# Patient Record
Sex: MALE | Race: WHITE | URBAN - METROPOLITAN AREA
[De-identification: names, ages, dates, MRNs, and addresses within clinical notes are randomized per-mention and may not be internally consistent; named-entity substitution may affect disease eponyms.]

---

## 2019-04-11 ENCOUNTER — HOSPITAL ENCOUNTER (EMERGENCY)
Facility: CLINIC | Age: 39
Discharge: HOME | End: 2019-04-11
Attending: NURSE PRACTITIONER | Admitting: NURSE PRACTITIONER

## 2019-04-11 VITALS
SYSTOLIC BLOOD PRESSURE: 143 MMHG | WEIGHT: 185 LBS | OXYGEN SATURATION: 98 % | RESPIRATION RATE: 16 BRPM | TEMPERATURE: 98 F | DIASTOLIC BLOOD PRESSURE: 99 MMHG

## 2019-04-11 DIAGNOSIS — H01.006: ICD-10-CM

## 2019-04-11 PROCEDURE — G0463 HOSPITAL OUTPT CLINIC VISIT: HCPCS | Performed by: NURSE PRACTITIONER

## 2019-04-11 PROCEDURE — 99202 OFFICE O/P NEW SF 15 MIN: CPT | Mod: Z6 | Performed by: NURSE PRACTITIONER

## 2019-04-11 RX ORDER — ERYTHROMYCIN 5 MG/G
0.5 OINTMENT OPHTHALMIC 4 TIMES DAILY
Qty: 3.5 G | Refills: 0 | Status: SHIPPED | OUTPATIENT
Start: 2019-04-11

## 2019-04-11 ASSESSMENT — ENCOUNTER SYMPTOMS
DIAPHORESIS: 0
WOUND: 0
SINUS PAIN: 0
COUGH: 0
CHILLS: 0
SPEECH DIFFICULTY: 0
CONSTIPATION: 0
SORE THROAT: 0
EYE ITCHING: 1
SHORTNESS OF BREATH: 0
VOMITING: 0
BLOOD IN STOOL: 0
HEMATURIA: 0
NAUSEA: 0
FEVER: 0
EYE PAIN: 1
EYE DISCHARGE: 1
ABDOMINAL PAIN: 0
DYSURIA: 0
DIARRHEA: 0
WHEEZING: 0
DIFFICULTY URINATING: 0
CONFUSION: 0
HEADACHES: 0

## 2019-04-11 NOTE — ED AVS SNAPSHOT
[facility]  [address] [address]  Phone:  [phone]  Fax:  [phone]                                    Dante Carranza   MRN: [id number]    Department:  [facility] Department   Date of Visit:  4/11/2019           After Visit Summary Signature Page    I have received my discharge instructions, and my questions have been answered. I have discussed any challenges I see with this plan with the nurse or doctor.    ..........................................................................................................................................  Patient/Patient Representative Signature      ..........................................................................................................................................  Patient Representative Print Name and Relationship to Patient    ..................................................               ................................................  Date                                   Time    ..........................................................................................................................................  Reviewed by Signature/Title    ...................................................              ..............................................  Date                                               Time          22EPIC Rev 08/18

## 2019-04-12 NOTE — ED PROVIDER NOTES
History     Chief Complaint   Patient presents with     Eye Problem     left eye burning sensation no injury no pain     HPI  Presents for evaluation for the following:     Eye(s) Problem      Duration: one day    Description:  Location: left  Pain: YES- dry, irritation, rough sensation  Redness: YES- upper eyelid  Discharge: YES- crusted in eyelid    Accompanying signs and symptoms: feeling tired    History (Trauma, foreign body exposure,): None    Precipitating or alleviating factors (contact use): None    Does not wear contacts or glasses    Therapies tried and outcome: None     Allergies:  Allergies   Allergen Reactions     Penicillins      rash       Problem List:    There are no active problems to display for this patient.       Past Medical History:    No past medical history on file.    Past Surgical History:    No past surgical history on file.    Family History:    No family history on file.    Social History:  Marital Status:  Single [1]  Social History     Tobacco Use     Smoking status: Not on file   Substance Use Topics     Alcohol use: Not on file     Drug use: Not on file        Medications:      erythromycin (ROMYCIN) 5 MG/GM ophthalmic ointment         Review of Systems   Constitutional: Negative for chills, diaphoresis and fever.   HENT: Negative for ear pain, sinus pain and sore throat.    Eyes: Positive for pain (feels soreness with eyelid movement), discharge (in eyelash) and itching. Negative for visual disturbance.   Respiratory: Negative for cough, shortness of breath and wheezing.    Cardiovascular: Negative for chest pain.   Gastrointestinal: Negative for abdominal pain, blood in stool, constipation, diarrhea, nausea and vomiting.   Genitourinary: Negative for difficulty urinating, dysuria and hematuria.   Skin: Negative for rash and wound.   Neurological: Negative for speech difficulty and headaches.   Psychiatric/Behavioral: Negative for confusion and self-injury.   All other systems  reviewed and are negative.      Physical Exam   BP: (!) 143/99  Heart Rate: 84  Temp: 98  F (36.7  C)  Resp: 16  Weight: 83.9 kg (185 lb)  SpO2: 98 %      Physical Exam   Constitutional: He is oriented to person, place, and time. He appears well-developed and well-nourished. No distress.   HENT:   Head: Normocephalic and atraumatic.   Right Ear: Hearing, tympanic membrane, external ear and ear canal normal.   Left Ear: Hearing, tympanic membrane, external ear and ear canal normal.   Nose: Nose normal.   Mouth/Throat: Uvula is midline, oropharynx is clear and moist and mucous membranes are normal.   Eyes: Pupils are equal, round, and reactive to light. EOM and lids are normal. Right eye exhibits no discharge. Left eye exhibits discharge (crusted in upper eyelid). Right conjunctiva is not injected. Left conjunctiva is not injected.       Neck: Neck supple.   Cardiovascular: Normal rate, regular rhythm, normal heart sounds and intact distal pulses. Exam reveals no gallop and no friction rub.   No murmur heard.  Pulmonary/Chest: Effort normal and breath sounds normal. No stridor. He has no wheezes.   Abdominal: There is no tenderness.   Musculoskeletal: He exhibits no edema.   Lymphadenopathy:     He has no cervical adenopathy.   Neurological: He is alert and oriented to person, place, and time.   Skin: Skin is warm. No rash noted. He is not diaphoretic.   Psychiatric: He has a normal mood and affect.   Nursing note and vitals reviewed.      ED Course        Procedures    No results found for this or any previous visit (from the past 24 hour(s)).    Medications - No data to display    Assessments & Plan (with Medical Decision Making)     I have reviewed the nursing notes.    I have reviewed the findings, diagnosis, plan and need for follow up with the patient.  Dante is a 38-year-old male with a 1 day history of left upper eyelid soreness and pain or tenderness that is reported with eyelid movement reports that  this morning when he woke up there was some crusting in the eyelashes without vision loss, without profuse purulent drainage, without fever, aches, chills, sweats  .  Exam as noted above.  No subconjunctival injection to indicate conjunctivitis; no nodules of the lashes consistent with chalazion or stye, no evidence of uveitis or scleritis will treat as blepharitis and discussed warm compresses and then plus or minus of ointments and patient requests ointment as well prescription for erythromycin eye ointment was prescribed and encouraged the importance of warm compresses to resolve the blepharitis.  Patient discharged in stable condition.     Medication List      Started    erythromycin 5 MG/GM ophthalmic ointment  Commonly known as:  ROMYCIN  0.5 inches, Left Eye, 4 TIMES DAILY            Final diagnoses:   Blepharitis, left eye       4/11/2019   [facility] EMERGENCY DEPARTMENT     Kaye Danielle APRN CNP  04/11/19 2019